# Patient Record
Sex: FEMALE | Race: WHITE | URBAN - METROPOLITAN AREA
[De-identification: names, ages, dates, MRNs, and addresses within clinical notes are randomized per-mention and may not be internally consistent; named-entity substitution may affect disease eponyms.]

---

## 2021-05-02 NOTE — PROGRESS NOTES
"[facility]  Gynecology Consult Visit    SUBJECTIVE    Amanda Skelton is a 55 year old  who is here for an EMB.    1.5 months ago noted change in vaginal discharge that slowly became bloody in nature. Lasted for several days. Then noticed bleeding after intercourse. Presented to [facility] for evaluation. Pap smear was done, fluconazole for yeast infection, and recommended EMB. No bleeding since. Tried tea tree oil suppositories 2x with no relief. But \"things haven't felt right since.\" Intermittent pelvic pain and dyspareunia. Some burning during urination. No itching or dryness.     Also notes that a small (1-2cm) lump is present on left labia majora. Was evaluated at [facility]. Considering biopsy vs excision but hasn't scheduled yet.     PAST MEDICAL HISTORY  H/o early stage breast cancer s/p bilateral mastectomy and 2 failed reconstructive attempt c/b lymphedema. 3 ER+ tumors found in L breast. Adjuvant Tamoxifen for 5 years. Regular tumor markers negative while on Tamoxifen. Stopped Tamoxifen 5 years ago.     H/o D&C w/ abalation while on Tamoxifen (7 years ago) for bleeding and cramping.     MEDICATIONS  No current outpatient medications on file.     No current facility-administered medications for this visit.        ALLERGIES  Not on File    OBSTETRIC/GYNECOLOGIC HISTORY  Her LMP is: 2 years ago  Menarche: 15  [place]-pause 53  Pap smear earlier this year.    3 prior vaginal deliveries  1 prior     PAST SURGICAL HISTORY   No past surgical history on file.   Bilateral mastectomy  Failed reconstruction     SOCIAL HISTORY  Social History     Tobacco Use     Smoking status: Not on file   Substance Use Topics     Alcohol use: Not on file     Drug use: Not on file       FAMILY HISTORY  No family history on file.    REVIEW OF SYSTEMS  A 10 point review of systems including Constitutional, Eyes, Respiratory, Cardiovascular, Gastroenterology, Genitourinary, Integumentary, Musculoskeletal, and " "Psychiatric, were all negative, except for pertinent positives noted in the above HPI.    OBJECTIVE  /75   Ht 1.581 m (5' 2.25\")   Wt 58.5 kg (129 lb)   Breastfeeding No   BMI 23.41 kg/m      General: Alert, without distress   HEENT: normocephalic, without obvious abnormality   Abdomen: soft, non-tender, non-distended, normal bowel sounds   Pelvic:  Mild atrophy but normal external female genitalia; normal vagina without discharge; normal cervix without lesions/masses; uterus small, mobile, nontender; adnexae nontender and without masses; normal anus/perineum   Extremities: normal    ENDOMETRIAL BIOPSY PROCEDURE NOTE    Pre-procedure diagnosis: 1. Abnormal Uterine Bleeding 2.  History of breast cancer s/p mastectomy and tamoxifen  Post-procedure diagnosis: 1. Abnormal Uterine Bleeding 2. History of breast cancer s/p mastectomy and tamoxifen    Procedure: Endometrial Biopsy    Amanda Skelton and I discussed the risks and benefits of endometrial biopsy, including pain, infection, uterine perforation, the possibility of the inability to complete the procedure, inadequate biopsy, and the potential need for future procedures. I offered her the alternative to start evaluation with ultrasound. She was given the opportunity to ask questions, and I then obtained written, informed consent was then obtained.     Amanda Skelton and I then performed a time out, where we confirmed the consent, procedure, and her name and date of birth.    I then placed a speculum into the vagina and visualized the cervix. I swabbed the cervix with betadine. I applied a tenaculum to the anterior lip of the cervix. I passed the endometrial pipelle through the cervix without difficulty and obtained a sample of endometrial tissue. I then removed the tenaculum and the cervix was found to be hemostatic; I then removed the speculum.    Amanda Skelton tolerated the procedure well.    EBL: <5cc  Complications: None  Specimen: " Endometrial biopsy    ASSESSMENT  Amanda Skelton is a 55 year old  postmenopausal female who is here with PMB and pelvic pain.    PLAN  -EMB performed and tolerated well, I will call her with results  -Discussed use of tea tree oil suppositories and how they can cause vaginal irritation    RTC as needed.    Nely Espinosa, MS4    OBGYN Attending Addendum    I appreciate the note above by medical student, Nely Espinosa.  I, Marichuy Marcial, was present with the medical student, who participated in the service and in the documentation of the note. I have verified the history and personally performed the physical exam and medical decision making. I have edited accordingly and agree with the assessment and plan of care as documented in the note.     I, was present for the entire procedure. I have reviewed student doctor Nely Maria's above description and edited where necessary. I agree with the documentation of findings.    Marichuy Marcial MD, MSCI    [facility]/OB[facility]N

## 2021-05-05 ENCOUNTER — OFFICE VISIT (OUTPATIENT)
Dept: OBGYN | Facility: CLINIC | Age: 56
End: 2021-05-05
Attending: OBSTETRICS & GYNECOLOGY
Payer: MEDICAID

## 2021-05-05 VITALS
SYSTOLIC BLOOD PRESSURE: 131 MMHG | DIASTOLIC BLOOD PRESSURE: 75 MMHG | BODY MASS INDEX: 23.74 KG/M2 | HEIGHT: 62 IN | WEIGHT: 129 LBS

## 2021-05-05 DIAGNOSIS — N95.0 POSTMENOPAUSAL BLEEDING: Primary | ICD-10-CM

## 2021-05-05 PROCEDURE — 58100 BIOPSY OF UTERUS LINING: CPT | Performed by: OBSTETRICS & GYNECOLOGY

## 2021-05-05 PROCEDURE — 88305 TISSUE EXAM BY PATHOLOGIST: CPT | Mod: TC | Performed by: OBSTETRICS & GYNECOLOGY

## 2021-05-05 PROCEDURE — 99203 OFFICE O/P NEW LOW 30 MIN: CPT | Mod: 25 | Performed by: OBSTETRICS & GYNECOLOGY

## 2021-05-05 PROCEDURE — 88305 TISSUE EXAM BY PATHOLOGIST: CPT | Mod: 26 | Performed by: PATHOLOGY

## 2021-05-05 ASSESSMENT — MIFFLIN-ST. JEOR: SCORE: 1147.36

## 2021-05-05 ASSESSMENT — PAIN SCALES - GENERAL: PAINLEVEL: NO PAIN (0)

## 2021-05-05 NOTE — LETTER
"2021       RE: Amanda Skelton  [address] [place] [facility] [address]     Dear Colleague,    Thank you for referring your patient, Amanda Skelton, to the [facility] [place] [facility]. Please see a copy of my visit note below.    [facility]  Gynecology Consult Visit    SUBJECTIVE    Amanda Skelton is a 55 year old  who is here for an EMB.    1.5 months ago noted change in vaginal discharge that slowly became bloody in nature. Lasted for several days. Then noticed bleeding after intercourse. Presented to [facility] for evaluation. Pap smear was done, fluconazole for yeast infection, and recommended EMB. No bleeding since. Tried tea tree oil suppositories 2x with no relief. But \"things haven't felt right since.\" Intermittent pelvic pain and dyspareunia. Some burning during urination. No itching or dryness.     Also notes that a small (1-2cm) lump is present on left labia majora. Was evaluated at [facility]. Considering biopsy vs excision but hasn't scheduled yet.     PAST MEDICAL HISTORY  H/o early stage breast cancer s/p bilateral mastectomy and 2 failed reconstructive attempt c/b lymphedema. 3 ER+ tumors found in L breast. Adjuvant Tamoxifen for 5 years. Regular tumor markers negative while on Tamoxifen. Stopped Tamoxifen 5 years ago.     H/o D&C w/ abalation while on Tamoxifen (7 years ago) for bleeding and cramping.     MEDICATIONS  No current outpatient medications on file.     No current facility-administered medications for this visit.        ALLERGIES  Not on File    OBSTETRIC/GYNECOLOGIC HISTORY  Her LMP is: 2 years ago  Menarche: 15  [place]-pause 53  Pap smear earlier this year.    3 prior vaginal deliveries  1 prior     PAST SURGICAL HISTORY   No past surgical history on file.   Bilateral mastectomy  Failed reconstruction     SOCIAL HISTORY  Social History     Tobacco Use     Smoking status: Not " "on file   Substance Use Topics     Alcohol use: Not on file     Drug use: Not on file       FAMILY HISTORY  No family history on file.    REVIEW OF SYSTEMS  A 10 point review of systems including Constitutional, Eyes, Respiratory, Cardiovascular, Gastroenterology, Genitourinary, Integumentary, Musculoskeletal, and Psychiatric, were all negative, except for pertinent positives noted in the above HPI.    OBJECTIVE  /75   Ht 1.581 m (5' 2.25\")   Wt 58.5 kg (129 lb)   Breastfeeding No   BMI 23.41 kg/m      General: Alert, without distress   HEENT: normocephalic, without obvious abnormality   Abdomen: soft, non-tender, non-distended, normal bowel sounds   Pelvic:  Mild atrophy but normal external female genitalia; normal vagina without discharge; normal cervix without lesions/masses; uterus small, mobile, nontender; adnexae nontender and without masses; normal anus/perineum   Extremities: normal    ENDOMETRIAL BIOPSY PROCEDURE NOTE    Pre-procedure diagnosis: 1. Abnormal Uterine Bleeding 2.  History of breast cancer s/p mastectomy and tamoxifen  Post-procedure diagnosis: 1. Abnormal Uterine Bleeding 2. History of breast cancer s/p mastectomy and tamoxifen    Procedure: Endometrial Biopsy    Amanda Skelton and I discussed the risks and benefits of endometrial biopsy, including pain, infection, uterine perforation, the possibility of the inability to complete the procedure, inadequate biopsy, and the potential need for future procedures. I offered her the alternative to start evaluation with ultrasound. She was given the opportunity to ask questions, and I then obtained written, informed consent was then obtained.     Amanda Skelton and I then performed a time out, where we confirmed the consent, procedure, and her name and date of birth.    I then placed a speculum into the vagina and visualized the cervix. I swabbed the cervix with betadine. I applied a tenaculum to the anterior lip of the cervix. I " passed the endometrial pipelle through the cervix without difficulty and obtained a sample of endometrial tissue. I then removed the tenaculum and the cervix was found to be hemostatic; I then removed the speculum.    Amanda Skelton tolerated the procedure well.    EBL: <5cc  Complications: None  Specimen: Endometrial biopsy    ASSESSMENT  Amanda Skelton is a 55 year old  postmenopausal female who is here with PMB and pelvic pain.    PLAN  -EMB performed and tolerated well, I will call her with results  -Discussed use of tea tree oil suppositories and how they can cause vaginal irritation    RTC as needed.    Nely Espinosa, MS4    OBGYN Attending Addendum    I appreciate the note above by medical student, Nely Espinosa.  I, Marichuy Marcial, was present with the medical student, who participated in the service and in the documentation of the note. I have verified the history and personally performed the physical exam and medical decision making. I have edited accordingly and agree with the assessment and plan of care as documented in the note.    Marichuy Marcial MD, MSCI    [facility]      Again, thank you for allowing me to participate in the care of your patient.      Sincerely,    Marichuy Marcial MD

## 2021-05-05 NOTE — LETTER
Date:May 10, 2021      Patient was self referred, no letter generated. Do not send.        [facility] Health Information

## 2021-05-12 LAB — COPATH REPORT: NORMAL

## 2021-05-13 ENCOUNTER — TELEPHONE (OUTPATIENT)
Dept: OBGYN | Facility: CLINIC | Age: 56
End: 2021-05-13

## 2021-05-13 NOTE — TELEPHONE ENCOUNTER
Amanda states that she is still spotting since biopsy.  This appears to be old, brown blood, and is enough to fill a panty liner daily.      She reports that she has burning discomfort when the blood comes out or with urination.  She does not have any frequency or urgency.    Biopsy results are back as of late yesterday.      Routed to Dr Marcial to review results and advise re: symptoms

## 2021-05-13 NOTE — TELEPHONE ENCOUNTER
----- Message from Gonzales Hunter sent at 5/12/2021  3:44 PM CDT -----  Regarding: Call Back  Contact: [phone]  05/12/2021 @ 3:46 PM    Pt Amanda requesting a call back regarding her biopsy results and persistent symptoms.  Please call at .    Thank you!   Gonzales NAPOLES     Please DO NOT send this message and / or reply back to sender. Call Center Representatives DO NOT respond to messages.

## 2021-05-14 ENCOUNTER — TELEPHONE (OUTPATIENT)
Dept: OBGYN | Facility: CLINIC | Age: 56
End: 2021-05-14

## 2021-05-14 DIAGNOSIS — N95.0 POSTMENOPAUSAL BLEEDING: Primary | ICD-10-CM

## 2021-05-14 NOTE — TELEPHONE ENCOUNTER
Called Amanda Dietz to review negative biopsy and discuss her ongoing symptoms. I wished to recommend US for further evaluation. No answer, LVM with instructions to call.     Marichuy Marcial MD, MSCI    [facility]

## 2021-05-14 NOTE — TELEPHONE ENCOUNTER
Stas Waldrop - I called but got voicemail. Since she is still bleeding and having some pain I think it is worthwhile to get an US. I have ordered it. If you and/or  could call her to schedule I would appreciate it.    Dr. Marcial

## 2021-05-17 ENCOUNTER — ANCILLARY PROCEDURE (OUTPATIENT)
Dept: ULTRASOUND IMAGING | Facility: CLINIC | Age: 56
End: 2021-05-17
Attending: OBSTETRICS & GYNECOLOGY
Payer: MEDICAID

## 2021-05-17 DIAGNOSIS — N95.0 POSTMENOPAUSAL BLEEDING: ICD-10-CM

## 2021-05-17 PROCEDURE — 76830 TRANSVAGINAL US NON-OB: CPT | Mod: 26 | Performed by: OBSTETRICS & GYNECOLOGY

## 2021-05-17 PROCEDURE — 76830 TRANSVAGINAL US NON-OB: CPT

## 2021-05-21 ENCOUNTER — TELEPHONE (OUTPATIENT)
Dept: OBGYN | Facility: CLINIC | Age: 56
End: 2021-05-21

## 2021-05-24 ENCOUNTER — TELEPHONE (OUTPATIENT)
Dept: OBGYN | Facility: CLINIC | Age: 56
End: 2021-05-24

## 2021-05-24 NOTE — TELEPHONE ENCOUNTER
Message received from patient regarding call back.     Called and spoke with patient. She states she continues to have difficulty seeing results in MyChart. Is wanting to know what her recent US showed.     Read results to patient. She verbalized understanding.    Advised pt to reach out to MyChart help line to assist in set up. She verbalized understanding and agreement. Denied further questions/concerns.

## 2021-05-24 NOTE — TELEPHONE ENCOUNTER
Message received from patient regarding wanting to see results in MyChart. Pt does not have MyChart established.     Called patient and left VM that a new [employer] activation code will be sent to phone number and to call with any questions or concerns or to discuss results.